# Patient Record
Sex: MALE | Race: WHITE | NOT HISPANIC OR LATINO | Employment: OTHER | URBAN - METROPOLITAN AREA
[De-identification: names, ages, dates, MRNs, and addresses within clinical notes are randomized per-mention and may not be internally consistent; named-entity substitution may affect disease eponyms.]

---

## 2021-05-25 ENCOUNTER — RECORDS - HEALTHEAST (OUTPATIENT)
Dept: ADMINISTRATIVE | Facility: CLINIC | Age: 86
End: 2021-05-25

## 2023-01-01 ENCOUNTER — HOSPITAL ENCOUNTER (EMERGENCY)
Facility: CLINIC | Age: 88
Discharge: LEFT BEFORE DISCHARGE | End: 2023-03-15
Attending: EMERGENCY MEDICINE | Admitting: EMERGENCY MEDICINE
Payer: MEDICARE

## 2023-01-01 ENCOUNTER — HOSPITAL ENCOUNTER (EMERGENCY)
Facility: CLINIC | Age: 88
Discharge: HOME | End: 2023-08-23
Attending: EMERGENCY MEDICINE | Admitting: EMERGENCY MEDICINE
Payer: MEDICARE

## 2023-01-01 ENCOUNTER — HOSPITAL ENCOUNTER (EMERGENCY)
Facility: CLINIC | Age: 88
Discharge: HOME | End: 2023-10-19
Attending: EMERGENCY MEDICINE | Admitting: EMERGENCY MEDICINE
Payer: MEDICARE

## 2023-01-01 ENCOUNTER — APPOINTMENT (OUTPATIENT)
Dept: RADIOLOGY | Facility: CLINIC | Age: 88
End: 2023-01-01
Attending: EMERGENCY MEDICINE
Payer: MEDICARE

## 2023-01-01 VITALS
BODY MASS INDEX: 29.47 KG/M2 | SYSTOLIC BLOOD PRESSURE: 202 MMHG | WEIGHT: 199 LBS | OXYGEN SATURATION: 96 % | HEIGHT: 69 IN | TEMPERATURE: 97.9 F | HEART RATE: 67 BPM | DIASTOLIC BLOOD PRESSURE: 92 MMHG | RESPIRATION RATE: 20 BRPM

## 2023-01-01 VITALS
DIASTOLIC BLOOD PRESSURE: 89 MMHG | RESPIRATION RATE: 28 BRPM | OXYGEN SATURATION: 94 % | SYSTOLIC BLOOD PRESSURE: 187 MMHG | HEART RATE: 87 BPM | WEIGHT: 220 LBS | TEMPERATURE: 98.2 F

## 2023-01-01 VITALS
TEMPERATURE: 98.9 F | SYSTOLIC BLOOD PRESSURE: 177 MMHG | DIASTOLIC BLOOD PRESSURE: 82 MMHG | OXYGEN SATURATION: 91 % | HEART RATE: 74 BPM | RESPIRATION RATE: 35 BRPM
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
## 2023-01-01 DIAGNOSIS — S63.259A FINGER DISLOCATION, INITIAL ENCOUNTER: ICD-10-CM

## 2023-01-01 DIAGNOSIS — K59.00 CONSTIPATION, UNSPECIFIED CONSTIPATION TYPE: ICD-10-CM

## 2023-01-01 DIAGNOSIS — R60.0 LOWER EXTREMITY EDEMA: ICD-10-CM

## 2023-01-01 DIAGNOSIS — S01.01XA SCALP LACERATION, INITIAL ENCOUNTER: ICD-10-CM

## 2023-01-01 LAB
ALBUMIN SERPL BCG-MCNC: 3.9 G/DL (ref 3.5–5.2)
ALP SERPL-CCNC: 64 U/L (ref 40–129)
ALT SERPL W P-5'-P-CCNC: 7 U/L (ref 0–70)
ANION GAP SERPL CALCULATED.3IONS-SCNC: 8 MMOL/L (ref 7–15)
AST SERPL W P-5'-P-CCNC: 19 U/L (ref 0–45)
ATRIAL RATE - MUSE: 76 BPM
BASO+EOS+MONOS # BLD AUTO: ABNORMAL 10*3/UL
BASO+EOS+MONOS NFR BLD AUTO: ABNORMAL %
BASOPHILS # BLD AUTO: 0 10E3/UL (ref 0–0.2)
BASOPHILS NFR BLD AUTO: 0 %
BILIRUB DIRECT SERPL-MCNC: <0.2 MG/DL (ref 0–0.3)
BILIRUB SERPL-MCNC: 0.4 MG/DL
BUN SERPL-MCNC: 12.3 MG/DL (ref 8–23)
CALCIUM SERPL-MCNC: 8.9 MG/DL (ref 8.8–10.2)
CHLORIDE SERPL-SCNC: 101 MMOL/L (ref 98–107)
CREAT SERPL-MCNC: 1.14 MG/DL (ref 0.67–1.17)
DEPRECATED HCO3 PLAS-SCNC: 30 MMOL/L (ref 22–29)
DIASTOLIC BLOOD PRESSURE - MUSE: NORMAL MMHG
EGFRCR SERPLBLD CKD-EPI 2021: 63 ML/MIN/1.73M2
EOSINOPHIL # BLD AUTO: 0.1 10E3/UL (ref 0–0.7)
EOSINOPHIL NFR BLD AUTO: 2 %
ERYTHROCYTE [DISTWIDTH] IN BLOOD BY AUTOMATED COUNT: 13.3 % (ref 10–15)
GLUCOSE SERPL-MCNC: 100 MG/DL (ref 70–99)
HCT VFR BLD AUTO: 36.6 % (ref 40–53)
HGB BLD-MCNC: 11.8 G/DL (ref 13.3–17.7)
IMM GRANULOCYTES # BLD: 0 10E3/UL
IMM GRANULOCYTES NFR BLD: 1 %
INTERPRETATION ECG - MUSE: NORMAL
LYMPHOCYTES # BLD AUTO: 2.2 10E3/UL (ref 0.8–5.3)
LYMPHOCYTES NFR BLD AUTO: 25 %
MAGNESIUM SERPL-MCNC: 2.1 MG/DL (ref 1.7–2.3)
MCH RBC QN AUTO: 30.4 PG (ref 26.5–33)
MCHC RBC AUTO-ENTMCNC: 32.2 G/DL (ref 31.5–36.5)
MCV RBC AUTO: 94 FL (ref 78–100)
MONOCYTES # BLD AUTO: 1.1 10E3/UL (ref 0–1.3)
MONOCYTES NFR BLD AUTO: 12 %
NEUTROPHILS # BLD AUTO: 5.1 10E3/UL (ref 1.6–8.3)
NEUTROPHILS NFR BLD AUTO: 60 %
NRBC # BLD AUTO: 0 10E3/UL
NRBC BLD AUTO-RTO: 0 /100
P AXIS - MUSE: 47 DEGREES
PLATELET # BLD AUTO: 326 10E3/UL (ref 150–450)
POTASSIUM SERPL-SCNC: 4.3 MMOL/L (ref 3.4–5.3)
PR INTERVAL - MUSE: 144 MS
PROT SERPL-MCNC: 6.3 G/DL (ref 6.4–8.3)
QRS DURATION - MUSE: 88 MS
QT - MUSE: 408 MS
QTC - MUSE: 459 MS
R AXIS - MUSE: 54 DEGREES
RBC # BLD AUTO: 3.88 10E6/UL (ref 4.4–5.9)
SODIUM SERPL-SCNC: 139 MMOL/L (ref 135–145)
SYSTOLIC BLOOD PRESSURE - MUSE: NORMAL MMHG
T AXIS - MUSE: 48 DEGREES
TROPONIN T SERPL HS-MCNC: 58 NG/L
TROPONIN T SERPL HS-MCNC: 64 NG/L
VENTRICULAR RATE- MUSE: 76 BPM
WBC # BLD AUTO: 8.5 10E3/UL (ref 4–11)

## 2023-01-01 PROCEDURE — 99285 EMERGENCY DEPT VISIT HI MDM: CPT | Mod: 25

## 2023-01-01 PROCEDURE — 84484 ASSAY OF TROPONIN QUANT: CPT | Performed by: EMERGENCY MEDICINE

## 2023-01-01 PROCEDURE — 12001 RPR S/N/AX/GEN/TRNK 2.5CM/<: CPT

## 2023-01-01 PROCEDURE — 83735 ASSAY OF MAGNESIUM: CPT | Performed by: EMERGENCY MEDICINE

## 2023-01-01 PROCEDURE — 96374 THER/PROPH/DIAG INJ IV PUSH: CPT | Mod: 59

## 2023-01-01 PROCEDURE — 250N000011 HC RX IP 250 OP 636: Performed by: EMERGENCY MEDICINE

## 2023-01-01 PROCEDURE — 93005 ELECTROCARDIOGRAM TRACING: CPT | Performed by: EMERGENCY MEDICINE

## 2023-01-01 PROCEDURE — 36415 COLL VENOUS BLD VENIPUNCTURE: CPT | Performed by: EMERGENCY MEDICINE

## 2023-01-01 PROCEDURE — 84484 ASSAY OF TROPONIN QUANT: CPT | Mod: 91 | Performed by: EMERGENCY MEDICINE

## 2023-01-01 PROCEDURE — 99283 EMERGENCY DEPT VISIT LOW MDM: CPT

## 2023-01-01 PROCEDURE — 71045 X-RAY EXAM CHEST 1 VIEW: CPT

## 2023-01-01 PROCEDURE — 26770 TREAT FINGER DISLOCATION: CPT | Mod: F9

## 2023-01-01 PROCEDURE — 80053 COMPREHEN METABOLIC PANEL: CPT | Performed by: EMERGENCY MEDICINE

## 2023-01-01 PROCEDURE — 85025 COMPLETE CBC W/AUTO DIFF WBC: CPT | Performed by: EMERGENCY MEDICINE

## 2023-01-01 PROCEDURE — 82248 BILIRUBIN DIRECT: CPT | Performed by: EMERGENCY MEDICINE

## 2023-01-01 PROCEDURE — 74019 RADEX ABDOMEN 2 VIEWS: CPT

## 2023-01-01 PROCEDURE — 999N000065 XR FINGER RIGHT G/E 2 VIEWS: Mod: RT

## 2023-01-01 RX ORDER — HYDROMORPHONE HYDROCHLORIDE 1 MG/ML
0.5 INJECTION, SOLUTION INTRAMUSCULAR; INTRAVENOUS; SUBCUTANEOUS ONCE
Status: COMPLETED | OUTPATIENT
Start: 2023-01-01 | End: 2023-01-01

## 2023-01-01 RX ORDER — FUROSEMIDE 20 MG
20 TABLET ORAL DAILY
Qty: 14 TABLET | Refills: 0 | Status: SHIPPED | OUTPATIENT
Start: 2023-01-01 | End: 2023-01-01

## 2023-01-01 RX ADMIN — HYDROMORPHONE HYDROCHLORIDE 0.5 MG: 1 INJECTION, SOLUTION INTRAMUSCULAR; INTRAVENOUS; SUBCUTANEOUS at 22:04

## 2023-01-01 ASSESSMENT — ACTIVITIES OF DAILY LIVING (ADL)
ADLS_ACUITY_SCORE: 35
ADLS_ACUITY_SCORE: 35

## 2023-03-15 PROBLEM — J44.9 COPD (CHRONIC OBSTRUCTIVE PULMONARY DISEASE) (H): Status: ACTIVE | Noted: 2021-09-13

## 2023-03-15 PROBLEM — G24.01 TARDIVE DYSKINESIA: Status: ACTIVE | Noted: 2023-01-01

## 2023-03-15 PROBLEM — F03.90 DEMENTIA (H): Status: ACTIVE | Noted: 2021-09-13

## 2023-03-15 PROBLEM — N18.31 STAGE 3A CHRONIC KIDNEY DISEASE (H): Status: ACTIVE | Noted: 2021-09-13

## 2023-03-15 NOTE — ED PROVIDER NOTES
Emergency Department Encounter     Evaluation Date & Time:   3/15/2023  3:06 PM    CHIEF COMPLAINT:  Constipation and Abdominal Pain      Triage Note:  Patient presents to the ED with c/o constipation and elevated blood pressures. Patient is currently on hospice but the RN wanted patient to be evaluated for a possible impaction and blood pressure control. Patient does take oxy and morphine for generalized pain. Patient brought in by grand daughter Alicia Bolaños. Her number is .             ED COURSE & MEDICAL DECISION MAKING:     ED Course as of 03/15/23 2025   Wed Mar 15, 2023   1546 Patient is requesting to leave the hospital against medical advice.  The patient is clinically sober, free from distracting injury, appears to have intact insight and judgment and reason, and in my opinion has the capacity to make decisions.  We had a lengthy discussion regarding their presenting signs and symptoms, including my concerns regarding them with the need for further evaluation and management.  They have verbalized understanding of these concerns.  I discussed in detail with the patient that if they leave, they could significantly worsen, become critically ill, and even possibly become disabled or die.  Despite offering alternative pathways to care, I am unable to convince the patient to stay.  I have asked them to return to the emergency department at any time if they change their mind, and we will be happy to resume their care.  I answered all additional questions and encouraged close follow up with a primary care provider if they choose not return to our ED.  The patient and family have no further questions at this time and understand they are always welcome back.       Pt's granddaughter informed by nursing of this and she will be in to take him home.  I did still send Lactulose to help constipation to his pharmacy.  Pt encouraged to follow up, return for new/worsening concerns.     Pt here from home where  "he's under hospice care for concerns about ongoing constipation and elevated blood pressures. Granddaughter confirms this is why he was sent in.  No vomiting, very benign abdomen.  Pt afebrile, nontoxic with no indication for emergent lowering of BP.  Granddaughter also reports she was told by nursing he is \"dehydrated\" and needs to be seen in the ED.  Will get labs, hydrate with IVF.  Abd xray from triage shows constipation.  Pt has no rectal pain or signs of fecal impaction.  Anticipate home with lactulose to help with constipation as pt on various narcotics for chronic pain.  Hospice concerned about his pain management, but this seems to stem from constipation discomfort at times.      3:10 PM I met the patient and performed my initial interview and exam.     Medical Decision Making    History:    Supplemental history from: Family Member/Significant Other    External Record(s) reviewed: Documented in chart, if applicable.    Work Up:    Chart documentation includes differential considered and any EKGs or imaging independently interpreted by provider, where specified.    In additional to work up documented, I considered the following work up: Documented in chart, if applicable.    External consultation:    Discussion of management with another provider: Documented in chart, if applicable    Complicating factors:    Care impacted by chronic illness: Chronic Kidney Disease, Chronic Lung Disease, Chronic Pain, Dementia, Diabetes, Hyperlipidemia, Hypertension and Smoking / Nicotine Use    Care affected by social determinants of health: N/A    Disposition considerations: Patient left AMA.      At the conclusion of the encounter I discussed the results of all the tests and the disposition. The questions were answered. The patient or family acknowledged understanding and was agreeable with the care plan.      MEDICATIONS GIVEN IN THE EMERGENCY DEPARTMENT:  Medications - No data to display    NEW PRESCRIPTIONS STARTED AT " "TODAY'S ED VISIT:  Discharge Medication List as of 3/15/2023  3:52 PM      START taking these medications    Details   lactulose (CEPHULAC) 20 GM packet Take 1 packet (20 g) by mouth daily for 5 days, Disp-5 packet, R-0, E-Prescribe             HPI   HPI     Austyn Nuñez is a 87 year old male with a pertinent history of dementia, COPD, DM type II, HTN, HLD, CKD 3, prior TIA, chronic pain, and tobacco use disorder who presents to this ED by walk-in for evaluation of constipation and abdominal pain.    The patient reports worsening constipation along with some left sided abdominal pain the past ~1 week. He states his last normal BM was 1 week ago. He has only had very small stools in the time since. He endorses regular use of pain medications. When asked about his left sided abdominal pain, the patient reports his \"kidney\" hurts. Denies any history of kidney transplant. The patient also notes some increased shortness of breath and dizziness the past few months. He says he feels lightheaded when he stands up and moves around. The patient otherwise denies any nausea, vomiting, rectal pain or bleeding, chest pain, or any other complaints at this time.     Per triage note, the patient was sent to the ED today after his hospice nurse noticed he has had elevated BP and worsening constipation. She reported concern about impaction. Triage note indicates patient does take oxycodone and morphine for generalized pain. Patient was brought in by his granddaughter, Alicia Bolaños. Her number is .    SHx: The patient reports he lives with his daughter and granddaughter.      REVIEW OF SYSTEMS:  Review of Systems    See HPI.      Medical History     Past Medical History:   Diagnosis Date     Anxiety state      Diabetes mellitus (H)        No past surgical history on file.    No family history on file.         lactulose (CEPHULAC) 20 GM packet  aspirin 325 MG tablet  docusate sodium (COLACE) 50 MG capsule  ferrous " sulfate 325 (65 FE) MG tablet  fish oil-omega-3 fatty acids (FISH OIL) 1000 MG capsule  gabapentin (NEURONTIN) 300 MG capsule  hydrocodone-acetaminophen 5-325 MG per tablet  MEDS UNK - RECORDS REQUESTED  metformin (GLUCOPHAGE) 1000 MG tablet  olanzapine (ZYPREXA) 5 MG tablet  omeprazole (PRILOSEC) 20 MG capsule  ondansetron (ZOFRAN) 4 MG tablet  ORDER FOR DME  ranitidine (ZANTAC) 150 MG tablet  simvastatin (ZOCOR) 40 MG tablet  sucralfate (CARAFATE) 1 GM tablet  TRAZodone (DESYREL) 50 MG tablet  venlafaxine (EFFEXOR-ER) 225 MG TB24        Physical Exam     Vitals:  BP (!) 187/89 (BP Location: Right arm, Patient Position: Semi-Flynn's, Cuff Size: Adult Regular)   Pulse 87   Temp 98.2  F (36.8  C) (Temporal)   Resp 28   Wt 99.8 kg (220 lb)   SpO2 94%     PHYSICAL EXAM:   Physical Exam  Vitals and nursing note reviewed.   Constitutional:       General: He is not in acute distress.     Appearance: Normal appearance.      Comments: Chronically ill-appearing   HENT:      Head: Normocephalic and atraumatic.      Nose: Nose normal.      Mouth/Throat:      Mouth: Mucous membranes are moist.   Eyes:      Pupils: Pupils are equal, round, and reactive to light.   Cardiovascular:      Rate and Rhythm: Normal rate and regular rhythm.      Pulses: Normal pulses.           Radial pulses are 2+ on the right side and 2+ on the left side.        Dorsalis pedis pulses are 2+ on the right side and 2+ on the left side.   Pulmonary:      Effort: Pulmonary effort is normal. No respiratory distress.      Breath sounds: Normal breath sounds.   Abdominal:      Palpations: Abdomen is soft.      Tenderness: There is no abdominal tenderness.   Musculoskeletal:      Cervical back: Full passive range of motion without pain and neck supple.      Comments: No calf tenderness or swelling b/l   Skin:     General: Skin is warm.      Findings: No rash.   Neurological:      General: No focal deficit present.      Mental Status: He is alert. Mental  status is at baseline.      Comments: Fluent speech, no acute lateralizing deficits   Psychiatric:         Mood and Affect: Mood normal.         Behavior: Behavior normal.         Results     LAB:  All pertinent labs reviewed and interpreted  Labs Ordered and Resulted from Time of ED Arrival to Time of ED Departure - No data to display    RADIOLOGY:  Abdomen XR, 2 vw, flat and upright   Final Result   IMPRESSION: Nonobstructive bowel gas pattern. Moderate amount of retained stool throughout the colon. No free air, pneumatosis, or portal venous gas to suggest perforation.                     FINAL IMPRESSION:    ICD-10-CM    1. Constipation, unspecified constipation type  K59.00           0 minutes of critical care time      I, Ayse Dumont, am serving as a scribe to document services personally performed by Dr. Biju Maldonado, based on my observations and the provider's statements to me. I, Biju Maldonado, DO attest that Ayse Dumont is acting in a scribe capacity, has observed my performance of the services and has documented them in accordance with my direction.      Biju Maldonado DO  Emergency Medicine  [facility] EMERGENCY ROOM  3/15/2023  3:17 PM        Biju Maldonado MD  03/15/23 2025

## 2023-03-15 NOTE — ED NOTES
"Pt left the room and went out into the waiting room. RN went and talked with the pt and he stated that he was \"looking for his granddaughter and would wait right here for her\". When asked if he wanted the RN to get blood work and to give a fluid bolus the pt declined and stated that he wanted to go home. MD was notified and the granddaughter was notified. Pt is waiting for his ride in the waiting room.     "

## 2023-03-15 NOTE — DISCHARGE INSTRUCTIONS
You are leaving against medical advice.  I put in for a lactulose prescription for you to take.  You can take this as directed, return for new concerns and follow up with primary clinic.

## 2023-03-15 NOTE — ED TRIAGE NOTES
Patient presents to the ED with c/o constipation and elevated blood pressures. Patient is currently on hospice but the RN wanted patient to be evaluated for a possible impaction and blood pressure control. Patient does take oxy and morphine for generalized pain. Patient brought in by grand daughter Alicia Bolaños. Her number is .     Triage Assessment     Row Name 03/15/23 2138       Triage Assessment (Adult)    Airway WDL WDL       Respiratory WDL    Respiratory WDL WDL       Skin Circulation/Temperature WDL    Skin Circulation/Temperature WDL WDL       Cardiac WDL    Cardiac WDL WDL       Peripheral/Neurovascular WDL    Peripheral Neurovascular WDL WDL       Cognitive/Neuro/Behavioral WDL    Cognitive/Neuro/Behavioral WDL WDL

## 2023-08-23 NOTE — DISCHARGE INSTRUCTIONS
Ice off-and-on if hurting.  Tylenol every 6 hours if hurting.  Staple removal in 10 days per hosp[facility] or clinic or [facility].  See doctor problems or signs of infection or concerns.

## 2023-08-23 NOTE — ED PROVIDER NOTES
EMERGENCY DEPARTMENT ENCOUNTER      NAME: Austyn Nuñez  AGE: 87 year old male  YOB: 1935  MRN: [id number]  EVALUATION DATE & TIME: 2023  1:22 AM    PCP: No Ref-Primary, Physician    ED PROVIDER: Biju Hodgson M.D.      Chief Complaint   Patient presents with    Laceration         FINAL IMPRESSION:  2 cm scalp laceration.      ED COURSE & MEDICAL DECISION MAKIN:50 AM.  I met with the patient to gather history and to perform my initial exam. We discussed plans for the ED course, including diagnostic testing and treatment. PPE worn: cloth mask.  Patient fell and tripped going to the bathroom at home and hit the posterior aspect of his head on the wheelchair.  He now has a 2 cm laceration.  [facility]  No loss conscious.  No blood thinners.  No neurological deficits.  Patient is on hospice for his COPD.  Laceration repair under taken as described below.  Patient be discharged home thereafter.  Patient and family in agreement.      Pertinent Labs & Imaging studies reviewed. (See chart for details)  87 year old male presents to the Emergency Department for evaluation of scalp laceration.    At the conclusion of the encounter I discussed the results of all of the tests and the disposition. The questions were answered. The patient or family acknowledged understanding and was agreeable with the care plan.              Medical Decision Making    History:  Supplemental history from: History from daughter.  External Record(s) reviewed: Both inpatient and outpatient computer records reviewed.    Work Up:  Chart documentation includes differential considered and any EKGs or imaging independently interpreted by provider, where specified.  Differential diagnosis includes scalp laceration.  In additional to work up documented, I considered the following work up: Documented in chart, if applicable.    External consultation:  Discussion of management with another provider:  Documented in chart, if applicable    Complicating factors:  Care impacted by chronic illness: History of dementia, diabetes, hypertension, schizoaffective disorder.  Care affected by social determinants of health: Access to Medical Care    Disposition considerations: Patient will be discharged home after laceration repair.          MEDICATIONS GIVEN IN THE EMERGENCY:    NEW PRESCRIPTIONS STARTED AT TODAY'S ER VISIT  New Prescriptions    No medications on file          =================================================================    HPI    Patient information was obtained from: Patient and daughter.    Use of : N/A         Austyn Nuñez is a 87 year old male with a pertinent history of COPD and dementia who presents to this ED today for evaluation of posterior scalp laceration.  2 cm laceration after falling and hitting his head on his wheelchair.  Tetanus status up-to-date as of 2021 at the [facility] for tetanus.  No other complaints.  Patient is on hospice.  No neurological deficits or loss conscious.  No blood thinners.    He does not identify any waxing or waning symptoms otherwise, exacerbating or alleviating features, associated symptoms except as mentioned.  He denies any pain related complaints.    REVIEW OF SYSTEMS   Review of Systems complaining of a scalp laceration only.  Mild headache in the same location.  No neurological deficits, no other complaints.    PAST MEDICAL HISTORY:  Past Medical History:   Diagnosis Date    Anxiety state     No Comments Provided    Diabetes mellitus (H)     No Comments Provided       PAST SURGICAL HISTORY:  No past surgical history on file.        CURRENT MEDICATIONS:    aspirin 325 MG tablet  docusate sodium (COLACE) 50 MG capsule  ferrous sulfate 325 (65 FE) MG tablet  fish oil-omega-3 fatty acids (FISH OIL) 1000 MG capsule  gabapentin (NEURONTIN) 300 MG capsule  hydrocodone-acetaminophen 5-325 MG per tablet  MEDS UNK - RECORDS REQUESTED  metformin  "(GLUCOPHAGE) 1000 MG tablet  olanzapine (ZYPREXA) 5 MG tablet  omeprazole (PRILOSEC) 20 MG capsule  ondansetron (ZOFRAN) 4 MG tablet  ORDER FOR DME  ranitidine (ZANTAC) 150 MG tablet  simvastatin (ZOCOR) 40 MG tablet  sucralfate (CARAFATE) 1 GM tablet  TRAZodone (DESYREL) 50 MG tablet  venlafaxine (EFFEXOR-ER) 225 MG TB24        ALLERGIES:  Allergies   Allergen Reactions    Oxycodone Rash    Thorazine [Chlorpromazine Hcl] Other (See Comments)     Tingling  & anxious        FAMILY HISTORY:  No family history on file.    SOCIAL HISTORY:   Social History     Socioeconomic History    Marital status: Single   Former tobacco use.  Marijuana use and opiate use.  No alcohol.    VITALS:  BP (!) 202/92   Pulse 67   Temp 97.9  F (36.6  C) (Temporal)   Resp 20   Ht 1.753 m (5' 9\")   Wt 90.3 kg (199 lb)   SpO2 96%   BMI 29.39 kg/m      PHYSICAL EXAM    Vital Signs:  BP (!) 202/92   Pulse 67   Temp 97.9  F (36.6  C) (Temporal)   Resp 20   Ht 1.753 m (5' 9\")   Wt 90.3 kg (199 lb)   SpO2 96%   BMI 29.39 kg/m    General:  On entering the room he is in no apparent distress.    Neck:  Neck supple with full range of motion and nontender.    Back:  Back and spine are nontender.  No costovertebral angle tenderness.    HEENT:  Oropharynx clear with moist mucous membranes.  HEENT unremarkable.    Pulmonary:  Chest clear to auscultation without rhonchi rales or wheezing.    Cardiovascular:  Cardiac regular rate and rhythm without murmurs rubs or gallops.    Abdomen:  Abdomen soft nontender.  There is no rebound or guarding.    Muskuloskeletal:  He moves all 4 without any difficulty and has normal neurovascular exams.  Extremities without clubbing, cyanosis, or edema.  Legs and calves are nontender.    Neuro:  He is alert and oriented ×3 and moves all extremities symmetrically.  Alert and oriented.  Moves all extremities symmetrically.  Sensation intact in all extremities.  Psych:  Normal affect.    Skin:  Unremarkable and warm " and dry.  2 cm posterior scalp laceration.  Laceration repair as discussed below.       LAB:  All pertinent labs reviewed and interpreted.  Labs Ordered and Resulted from Time of ED Arrival to Time of ED Departure - No data to display    RADIOLOGY:  Reviewed all pertinent imaging. Please see official radiology report.  No orders to display              EKG:            PROCEDURES:   Was anesthetized with 1% plain lidocaine.  Thoroughly irrigated and explored and closed with 3 staples.  Patient tolerated the procedure well.      Biju Hodgson M.D.  Emergency Medicine  [facility] EMERGENCY ROOM  [address] [place] [id number] [phone]  Dept: [phone]       Biju Hodgson MD  08/23/23 0151

## 2023-10-19 NOTE — ED TRIAGE NOTES
Pt presents to the ED with c/o fall, finger injury, and Cp that occurred PTA. Pt fell onto L hand, left 5th digit dislocated. Pt states that the left sided CP started after the fall. Pt did not hit head, no LOC, not on blood thinners. Granddaughter endorses mechanical fall. Granddaughter states that pt is on hospice.      Triage Assessment (Adult)       Row Name 10/18/23 2866          Triage Assessment    Airway WDL WDL        Respiratory WDL    Respiratory WDL WDL        Skin Circulation/Temperature WDL    Skin Circulation/Temperature WDL WDL        Cardiac WDL    Cardiac WDL X;chest pain        Chest Pain Assessment    Chest Pain Location anterior chest, left     Precipitating Factors other (see comments)  fall        Peripheral/Neurovascular WDL    Peripheral Neurovascular WDL WDL        Cognitive/Neuro/Behavioral WDL    Cognitive/Neuro/Behavioral WDL WDL

## 2023-10-19 NOTE — ED PROVIDER NOTES
Emergency Department Encounter     Evaluation Date & Time:   10/18/2023  9:34 PM    CHIEF COMPLAINT:  Fall and Chest Pain      Triage Note:              ED COURSE & MEDICAL DECISION MAKING:     ED Course as of 10/19/23 0133   Wed Oct 18, 2023   2204 Finger reduced without difficulty and pt tolerated well. Xrays ordered for afterwards.  Will place in finger splint.   2234 hsTrop 64, will need 2 hour delta.  Order placed.   2253 Rest of labs overall unremarkable and reassuring.   2256 CXR (independent interpretation): mild edema, no focal opacities    Right finger xrays (independent interp): no acute displaced fracture or dislocation   2340 Discussed with pt and granddaughter results.  Pt has chronic b/l LE edema and redness.  He has home O2, but doesn't typically wear. Pt and daughter state redness is baseline. Pt refuses to stay in hospital after our discussion regarding hospitalization for some diuresis and monitoring. Will discharge with lasix low dose and refer to cardiology. Encouraged him to wear O2 at home.     Thu Oct 19, 2023   0021 Delta trop 58, ACS ruled out per protocol.  Pt refuses to stay in hospital for further care.     Pt here with ground level, mechanical fall just pta at home.  Pt tripped on ground, landed on buttocks and right hand.  Presents with right pinky finger dislocation at the PIP.  Will need reduction.  I did digital nerve block per his request and will manipulate into place.  Pt and family deny any sort of LOC or head injury. Pt not anticoagulated and no new neck or back pain.  No indication for CT brain or spinal imaging.  Pt developed left sided chest pain after fall.  Was not having pain before and pain is not reproducible.  No new dyspnea or new abdominal pain. Will get labs, CXR and reassess.  No indication for CTA chest with very reassuring vitals.    9:41 PM I met with the patient, obtained history, performed an initial exam, and discussed options and plan for diagnostics and  treatment here in the ED.   9:58 PM I reduced patient's finger.   11:23 PM I rechecked and updated patient on results and care plan.   12:26 AM I rechecked and updated patient on results. We discussed the plan for discharge and the patient is agreeable. Reviewed supportive cares, symptomatic treatment, outpatient follow up, and reasons to return to the Emergency Department. Patient to be discharged by ED RN.      Medical Decision Making    History:  Supplemental history from: Documented in chart, if applicable and Caregiver  External Record(s) reviewed: Documented in chart, if applicable.    Work Up:  Chart documentation includes differential considered and any EKGs or imaging independently interpreted by provider, where specified.  In additional to work up documented, I considered the following work up: Documented in chart, if applicable.    External consultation:  Discussion of management with another provider: Documented in chart, if applicable    Complicating factors:  Care impacted by chronic illness: Chronic Kidney Disease, Chronic Lung Disease, Dementia, Diabetes, Hyperlipidemia, Hypertension, and Mental Health  Care affected by social determinants of health: N/A    Disposition considerations: Discharge. I prescribed additional prescription strength medication(s) as charted. See documentation for any additional details.      At the conclusion of the encounter I discussed the results of all the tests and the disposition. The questions were answered. The patient or family acknowledged understanding and was agreeable with the care plan.      MEDICATIONS GIVEN IN THE EMERGENCY DEPARTMENT:  Medications   HYDROmorphone (PF) (DILAUDID) injection 0.5 mg (0.5 mg Intravenous $Given 10/18/23 2204)       NEW PRESCRIPTIONS STARTED AT TODAY'S ED VISIT:  Discharge Medication List as of 10/19/2023  1:09 AM        START taking these medications    Details   furosemide (LASIX) 20 MG tablet Take 1 tablet (20 mg) by mouth daily  for 14 days, Disp-14 tablet, R-0, E-Prescribe             HPI   HPI     Austyn Nuñez is a 87 year old male with a pertinent history of HTN, hyperlipidemia, COPD, DM2, CKD, TIA, SCOTTY, schizoaffective disorder, dementia, opioid dependence, cannabis abuse, who presents to this ED via walk-in for evaluation of fall and chest pain.    Patient reports he tripped on the rug tonight about 25 minutes ago and fell down. No head injuries. Granddaughter states that she heard him fall down and found him on the ground on his buttocks. She believes that patient might have fallen onto his right pinky finger. Patient now reports deformity to right pinky. After the fall, he states that he developed some left chest pain. Patient also feels short of breath but granddaughter reports a history of COPD. He is unsure if shortness of breath is worse than normal. He also notes some abdominal pain but granddaughter states this is chronic. Otherwise, he denies any neck pain and back pain. He is not on blood thinners. Patient is right handed. No other complaints at this time.     REVIEW OF SYSTEMS:  See HPI      Medical History     Past Medical History:   Diagnosis Date    Anxiety state     Diabetes mellitus (H)        History reviewed. No pertinent surgical history.    History reviewed. No pertinent family history.         furosemide (LASIX) 20 MG tablet  aspirin 325 MG tablet  docusate sodium (COLACE) 50 MG capsule  ferrous sulfate 325 (65 FE) MG tablet  fish oil-omega-3 fatty acids (FISH OIL) 1000 MG capsule  gabapentin (NEURONTIN) 300 MG capsule  hydrocodone-acetaminophen 5-325 MG per tablet  MEDS UNK - RECORDS REQUESTED  metformin (GLUCOPHAGE) 1000 MG tablet  olanzapine (ZYPREXA) 5 MG tablet  omeprazole (PRILOSEC) 20 MG capsule  ondansetron (ZOFRAN) 4 MG tablet  ORDER FOR DME  ranitidine (ZANTAC) 150 MG tablet  simvastatin (ZOCOR) 40 MG tablet  sucralfate (CARAFATE) 1 GM tablet  TRAZodone (DESYREL) 50 MG tablet  venlafaxine  (EFFEXOR-ER) 225 MG TB24        Physical Exam     Vitals:  BP (!) 177/82   Pulse 74   Temp 98.9  F (37.2  C) (Oral)   Resp (!) 35   SpO2 91%     PHYSICAL EXAM:   Physical Exam  Vitals and nursing note reviewed.   Constitutional:       General: He is not in acute distress.     Appearance: Normal appearance.      Comments: Anxious   HENT:      Head: Normocephalic and atraumatic.      Nose: Nose normal.      Mouth/Throat:      Mouth: Mucous membranes are moist.   Eyes:      Pupils: Pupils are equal, round, and reactive to light.   Neck:      Vascular: No JVD.   Cardiovascular:      Rate and Rhythm: Normal rate and regular rhythm.      Pulses: Normal pulses.           Radial pulses are 2+ on the right side and 2+ on the left side.        Dorsalis pedis pulses are 2+ on the right side and 2+ on the left side.   Pulmonary:      Effort: Pulmonary effort is normal. No respiratory distress.      Breath sounds: Normal breath sounds.   Chest:      Chest wall: No tenderness.   Abdominal:      Palpations: Abdomen is soft.      Tenderness: There is no abdominal tenderness.   Musculoskeletal:      Cervical back: Full passive range of motion without pain and neck supple.      Right lower le+ Edema (chronic) present.      Left lower le+ Edema (chronic) present.      Comments: Right pinky finger is laterally dislocated at PIP. No open wound or bleeding. No calf tenderness or swelling b/l.    Skin:     General: Skin is warm.      Findings: No rash.   Neurological:      General: No focal deficit present.      Mental Status: He is alert. Mental status is at baseline.      Comments: Fluent speech, no acute lateralizing deficits   Psychiatric:         Behavior: Behavior normal.         Results     LAB:  All pertinent labs reviewed and interpreted  Labs Ordered and Resulted from Time of ED Arrival to Time of ED Departure   BASIC METABOLIC PANEL - Abnormal       Result Value    Sodium 139      Potassium 4.3      Chloride 101       Carbon Dioxide (CO2) 30 (*)     Anion Gap 8      Urea Nitrogen 12.3      Creatinine 1.14      GFR Estimate 63      Calcium 8.9      Glucose 100 (*)    TROPONIN T, HIGH SENSITIVITY - Abnormal    Troponin T, High Sensitivity 64 (*)    HEPATIC FUNCTION PANEL - Abnormal    Protein Total 6.3 (*)     Albumin 3.9      Bilirubin Total 0.4      Alkaline Phosphatase 64      AST 19      ALT 7      Bilirubin Direct <0.20     CBC WITH PLATELETS AND DIFFERENTIAL - Abnormal    WBC Count 8.5      RBC Count 3.88 (*)     Hemoglobin 11.8 (*)     Hematocrit 36.6 (*)     MCV 94      MCH 30.4      MCHC 32.2      RDW 13.3      Platelet Count 326      % Neutrophils 60      % Lymphocytes 25      % Monocytes 12      Mids % (Monos, Eos, Basos)        % Eosinophils 2      % Basophils 0      % Immature Granulocytes 1      NRBCs per 100 WBC 0      Absolute Neutrophils 5.1      Absolute Lymphocytes 2.2      Absolute Monocytes 1.1      Mids Abs (Monos, Eos, Basos)        Absolute Eosinophils 0.1      Absolute Basophils 0.0      Absolute Immature Granulocytes 0.0      Absolute NRBCs 0.0     TROPONIN T, HIGH SENSITIVITY - Abnormal    Troponin T, High Sensitivity 58 (*)    MAGNESIUM - Normal    Magnesium 2.1         RADIOLOGY:  XR Finger Right G/E 2 Views   Final Result   IMPRESSION: Normal alignment. No fracture.      XR Chest Port 1 View   Final Result   IMPRESSION:    1. Mildly increased interstitial opacities in both lungs, most likely related to interstitial pulmonary edema.   2. No other convincing evidence of active cardiopulmonary disease.                    ECG:  NSR, rate 76, frequent PVCs, no acute ischemia    I have independently reviewed and interpreted the EKG(s) documented above     PROCEDURES:  Procedures:  PROCEDURE:  1. Finger Orthopedic Reduction    2. Finger Splint Application   INDICATIONS:  Dislocated PIP finger   PROCEDURE PROVIDER: Dr Ehsan Maldonado   MEDICATIONS: 1% lidocaine without epinephrine for digital nerve block    PROCEDURE NOTE: ORTHOPEDIC MANAGEMENT:  Bone/Joint Manipulation: Affected extremity was grasped and gradual traction was applied in-line with fracture. The site was further manipulated manually until alignment was improved.     Alignment improved post procedure.     SPLINTING/IMMOBILIZATION:   A  aluminum finger  splint made of  aluminum  was applied.  After placement I checked and adjusted the fit to ensure proper positioning. Patient was more comfortable with the splint in place.  Sensation and circulation are intact after splint placement.   COMPLICATIONS: Patient tolerated procedure well, without complication           FINAL IMPRESSION:    ICD-10-CM    1. Ground-level fall  W18.30XA       2. Finger dislocation, initial encounter  S63.259A       3. Lower extremity edema  R60.0 [facility]  Referral    chronic          0 minutes of critical care time      I, Aline Banuelos, am serving as a scribe to document services personally performed by Dr. Biju Maldonado, based on my observations and the provider's statements to me. I, Biju Maldonado, DO attest that Aline Banuelos is acting in a scribe capacity, has observed my performance of the services and has documented them in accordance with my direction.      Biju Maldonado DO  Emergency Medicine  [facility] EMERGENCY ROOM  10/18/2023  9:37 PM          Biju Maldonado MD  10/19/23 0133

## 2023-10-19 NOTE — DISCHARGE INSTRUCTIONS
Start taking lasix daily and follow up with primary clinic and cardiology.  I put in for cardiology referral - they should be calling you for appointment to be seen in the next week or so.  Elevate legs when able.  Please use your home oxygen at home.  Keep finger in splint for comfort and follow up with primary clinic regarding this.

## 2024-01-01 ENCOUNTER — HOSPITAL ENCOUNTER (EMERGENCY)
Facility: CLINIC | Age: 89
Discharge: HOME | End: 2024-02-14
Attending: EMERGENCY MEDICINE | Admitting: EMERGENCY MEDICINE
Payer: OTHER MISCELLANEOUS

## 2024-01-01 ENCOUNTER — APPOINTMENT (OUTPATIENT)
Dept: CT IMAGING | Facility: CLINIC | Age: 89
End: 2024-01-01
Attending: EMERGENCY MEDICINE
Payer: OTHER MISCELLANEOUS

## 2024-01-01 VITALS
RESPIRATION RATE: 20 BRPM | TEMPERATURE: 97.9 F | WEIGHT: 208.6 LBS | BODY MASS INDEX: 29.86 KG/M2 | OXYGEN SATURATION: 97 % | HEART RATE: 71 BPM | SYSTOLIC BLOOD PRESSURE: 135 MMHG | HEIGHT: 70 IN | DIASTOLIC BLOOD PRESSURE: 76 MMHG

## 2024-01-01 DIAGNOSIS — S01.01XA SCALP LACERATION, INITIAL ENCOUNTER: ICD-10-CM

## 2024-01-01 DIAGNOSIS — E86.0 DEHYDRATION: ICD-10-CM

## 2024-01-01 DIAGNOSIS — E87.6 HYPOKALEMIA: ICD-10-CM

## 2024-01-01 DIAGNOSIS — N28.9 RENAL INSUFFICIENCY: ICD-10-CM
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
## 2024-01-01 DIAGNOSIS — S09.90XA CLOSED HEAD INJURY, INITIAL ENCOUNTER: ICD-10-CM

## 2024-01-01 DIAGNOSIS — J44.9 END STAGE COPD (H): ICD-10-CM

## 2024-01-01 LAB
ANION GAP SERPL CALCULATED.3IONS-SCNC: 11 MMOL/L (ref 7–15)
BASOPHILS # BLD AUTO: 0 10E3/UL (ref 0–0.2)
BASOPHILS NFR BLD AUTO: 0 %
BUN SERPL-MCNC: 43.7 MG/DL (ref 8–23)
CALCIUM SERPL-MCNC: 9.8 MG/DL (ref 8.8–10.2)
CHLORIDE SERPL-SCNC: 91 MMOL/L (ref 98–107)
CREAT SERPL-MCNC: 1.62 MG/DL (ref 0.67–1.17)
DEPRECATED HCO3 PLAS-SCNC: 39 MMOL/L (ref 22–29)
EGFRCR SERPLBLD CKD-EPI 2021: 41 ML/MIN/1.73M2
EOSINOPHIL # BLD AUTO: 0.2 10E3/UL (ref 0–0.7)
EOSINOPHIL NFR BLD AUTO: 2 %
ERYTHROCYTE [DISTWIDTH] IN BLOOD BY AUTOMATED COUNT: 13.9 % (ref 10–15)
GLUCOSE SERPL-MCNC: 179 MG/DL (ref 70–99)
HCT VFR BLD AUTO: 44.7 % (ref 40–53)
HGB BLD-MCNC: 14.6 G/DL (ref 13.3–17.7)
HOLD SPECIMEN: NORMAL
HOLD SPECIMEN: NORMAL
IMM GRANULOCYTES # BLD: 0 10E3/UL
IMM GRANULOCYTES NFR BLD: 0 %
LYMPHOCYTES # BLD AUTO: 2.1 10E3/UL (ref 0.8–5.3)
LYMPHOCYTES NFR BLD AUTO: 24 %
MCH RBC QN AUTO: 30.4 PG (ref 26.5–33)
MCHC RBC AUTO-ENTMCNC: 32.7 G/DL (ref 31.5–36.5)
MCV RBC AUTO: 93 FL (ref 78–100)
MONOCYTES # BLD AUTO: 0.8 10E3/UL (ref 0–1.3)
MONOCYTES NFR BLD AUTO: 9 %
NEUTROPHILS # BLD AUTO: 5.7 10E3/UL (ref 1.6–8.3)
NEUTROPHILS NFR BLD AUTO: 65 %
NRBC # BLD AUTO: 0 10E3/UL
NRBC BLD AUTO-RTO: 0 /100
PLATELET # BLD AUTO: 255 10E3/UL (ref 150–450)
POTASSIUM SERPL-SCNC: 2.8 MMOL/L (ref 3.4–5.3)
RBC # BLD AUTO: 4.81 10E6/UL (ref 4.4–5.9)
SODIUM SERPL-SCNC: 141 MMOL/L (ref 135–145)
WBC # BLD AUTO: 8.8 10E3/UL (ref 4–11)

## 2024-01-01 PROCEDURE — 99285 EMERGENCY DEPT VISIT HI MDM: CPT | Mod: 25

## 2024-01-01 PROCEDURE — 85025 COMPLETE CBC W/AUTO DIFF WBC: CPT | Performed by: EMERGENCY MEDICINE

## 2024-01-01 PROCEDURE — 82374 ASSAY BLOOD CARBON DIOXIDE: CPT | Performed by: EMERGENCY MEDICINE

## 2024-01-01 PROCEDURE — 36415 COLL VENOUS BLD VENIPUNCTURE: CPT | Performed by: EMERGENCY MEDICINE

## 2024-01-01 PROCEDURE — 12002 RPR S/N/AX/GEN/TRNK2.6-7.5CM: CPT

## 2024-01-01 PROCEDURE — 70450 CT HEAD/BRAIN W/O DYE: CPT | Mod: ME

## 2024-01-01 PROCEDURE — G1010 CDSM STANSON: HCPCS

## 2024-01-01 PROCEDURE — 82565 ASSAY OF CREATININE: CPT | Performed by: EMERGENCY MEDICINE

## 2024-01-01 RX ORDER — POTASSIUM CHLORIDE 1500 MG/1
20 TABLET, EXTENDED RELEASE ORAL 2 TIMES DAILY
Qty: 60 TABLET | Refills: 0 | Status: SHIPPED | OUTPATIENT
Start: 2024-01-01

## 2024-01-01 ASSESSMENT — ACTIVITIES OF DAILY LIVING (ADL)
ADLS_ACUITY_SCORE: 35
ADLS_ACUITY_SCORE: 35

## 2024-02-14 NOTE — ED TRIAGE NOTES
Pt reports to the ED via EMS. PT lives at home with his family. Family states he had an unwitnessed fall. After he fell he walked into the living room and told his family he fell. PT has a large head lac to the right anterior side of his head that is was not controlled when EMS brought him in. Controlled with staff controlled pressure dressing in triage.   Triage Assessment (Adult)       Row Name 02/14/24 1256          Triage Assessment    Airway WDL WDL        Respiratory WDL    Respiratory WDL WDL        Peripheral/Neurovascular WDL    Peripheral Neurovascular WDL WDL

## 2024-02-14 NOTE — ED NOTES
Bed: [facility]19  Expected date: 2/14/24  Expected time: 12:38 PM  Means of arrival: Ambulance  Comments:

## 2024-02-14 NOTE — DISCHARGE INSTRUCTIONS
See clinic to have his staples removed in 7 to 10 days.  In the meantime he can do daily application of a topical antibiotic ointment like Neosporin or bacitracin.  Apply to the wound twice a day.  Keep it clean.  It is okay to shower but it should not be submerged in dirty water so no swimming or submersion in bath tub.    Talk with him and his primary care provider about assigning a power of .  He does seem to have some memory issues and it would be better to have an assigned decision-maker.    Talk with his hospice about adding potassium.  This can help with strength especially in someone who is chronically on a diuretic like Lasix.

## 2024-02-14 NOTE — ED PROVIDER NOTES
Emergency Department Encounter     Evaluation Date & Time:   2024 12:42 PM    CHIEF COMPLAINT:  Fall and Head Laceration      Triage Note:Pt reports to the ED via EMS. PT lives at home with his family. Family states he had an unwitnessed fall. After he fell he walked into the living room and told his family he fell. PT has a large head lac to the right anterior side of his head that is was not controlled when EMS brought him in. Controlled with staff controlled pressure dressing in triage.   Triage Assessment (Adult)       Row Name 24 1256          Triage Assessment    Airway WDL WDL        Respiratory WDL    Respiratory WDL WDL        Peripheral/Neurovascular WDL    Peripheral Neurovascular WDL WDL                         FINAL IMPRESSION:    ICD-10-CM    1. Closed head injury, initial encounter  S09.90XA       2. End stage COPD (H)  J44.9       3. Scalp laceration, initial encounter  S01.01XA       4. Fall at home, initial encounter  W19.XXXA     Y92.009       5. Renal insufficiency  N28.9       6. Dehydration  E86.0       7. Hypokalemia  E87.6           Impression and Plan     ED COURSE & MEDICAL DECISION MAKIN:10 PM I met with the patient to gather history and to perform my initial exam. We discussed plans for the ED course, including diagnostic testing and treatment.   1:14 PM Anaesthetic given   1:19 PM Laceration procedure used to close wound.   ED Course as of 24 1525      1344 Called to the patient's bedside earlier for repair of his laceration since it was continuing to spurt blood whenever they let a pressure.  It had been irrigated by nursing staff.  So, I did do a laceration repair after injecting with lidocaine with epinephrine.  That injection as well as the nurses initial pressure did stop the bleeding and then I did decide to do 3 subcuticular stitches to bring the wound together for better bleeding control.  Wound was then closed with staples.  The fall was  unwitnessed.  Patient himself is unclear as to what made him fall but it was in the bedroom so may simply have been orthostatic.  Will do a general set of blood work as although he is on hospice the family would be curious to find out if he were significantly dehydrated it could guide their therapy at home to work on hydration.  Also, they would like to do a CT scan of his head to know if there is bleeding there or what to expect in order to make more educated decisions regarding his care going forwards especially as he has been doing well while on hospice.   1347 He does tend to be mild to moderately hypoxic but is not symptomatic for it.  Family notes that he has end-stage COPD and has been told that he should wear oxygen previously but he has chosen not to.   1348 Patient's chart was reviewed.  He has 2 previous ER visits that I have reviewed on October 2023 and August 2023 both for fall and 1 with a scalp laceration.   1437 I talked with the patient's niece who is his primary caregiver.  She does note that he is currently his own decision maker though he seems to have some significant memory issues and cognitive decline so I have advised that they address who should be his power of  for medical decision making.  She will do this with their hospice team.  It was her mother who has since passed away.  She is now his primary caregiver but he does also have a daughter who is involved and lives in town.  For now, both his daughter and the niece agree that they do want to do the initial blood work here and CAT scan of his head to make decisions about what the next step should be since he was placed on hospice for COPD and has so far done okay   1438 Imaging reviewed, and the final radiology read.  Agree with radiology read.  In terms of the possible lower C-spine fracture clinically this is not present as he has no tenderness in the area and is mention he is clinically cleared without pain or radicular  symptoms with range of motion so this appears to be old.  He has not provided a urine sample yet which is less likely in an adult male without fever or symptoms, no anemia.  Awaiting BMP and if that is unremarkable should be able to discharge home.   1521 So he is a bit dehydrated and does have a low potassium.  They do feel comfortable with discharge home and will continue to encourage him to drink more fluids.  Will put him on some potassium replacement this should help with strength.       At the conclusion of the encounter I discussed the results of all the tests and the disposition. The questions were answered. The patient or family acknowledged understanding and was agreeable with the care plan.          0 minutes of critical care time        MEDICATIONS GIVEN IN THE EMERGENCY DEPARTMENT:  Medications - No data to display    NEW PRESCRIPTIONS STARTED AT TODAY'S ED VISIT:  New Prescriptions    POTASSIUM CHLORIDE ER (KLOR-CON M) 20 MEQ CR TABLET    Take 1 tablet (20 mEq) by mouth 2 times daily       HPI     HPI     Austyn Nuñez is a 88 year old male with a pertinent history of cannabis abuse, obesity, opioid dependence, COPD, hyperlipidemia, hypertension, DM2, mental health, CKD3a, TIA,  who presents to this ED via EMS for evaluation of laceration.     Patient's family reports he had an unwitnessed fall. Afterward he was able to walk to the living room and alter family that he had fallen. Sustained a large laceration to his right forehead. Denies any other injury. No use of blood thinners. Patient lives with his niece and is currently on hospice.     Per patient, he does not recall why he feel although he feels like it was more mechanical.     REVIEW OF SYSTEMS:  Review of Systems  remainder of systems are all otherwise negative.      Medical History     Past Medical History:   Diagnosis Date    Anxiety state     Diabetes mellitus (H)        No past surgical history on file.    No family history on  "file.         potassium chloride ER (KLOR-CON M) 20 MEQ CR tablet  aspirin 325 MG tablet  docusate sodium (COLACE) 50 MG capsule  ferrous sulfate 325 (65 FE) MG tablet  fish oil-omega-3 fatty acids (FISH OIL) 1000 MG capsule  furosemide (LASIX) 20 MG tablet  gabapentin (NEURONTIN) 300 MG capsule  hydrocodone-acetaminophen 5-325 MG per tablet  MEDS UNK - RECORDS REQUESTED  metformin (GLUCOPHAGE) 1000 MG tablet  olanzapine (ZYPREXA) 5 MG tablet  omeprazole (PRILOSEC) 20 MG capsule  ondansetron (ZOFRAN) 4 MG tablet  ORDER FOR DME  ranitidine (ZANTAC) 150 MG tablet  simvastatin (ZOCOR) 40 MG tablet  sucralfate (CARAFATE) 1 GM tablet  TRAZodone (DESYREL) 50 MG tablet  venlafaxine (EFFEXOR-ER) 225 MG TB24        Physical Exam     First Vitals:  Patient Vitals for the past 24 hrs:   BP Temp Temp src Pulse Resp SpO2 Height Weight   02/14/24 1445 -- 97.9  F (36.6  C) Oral -- -- -- -- --   02/14/24 1253 (!) 147/79 -- -- 83 19 (!) 86 % 1.778 m (5' 10\") 94.6 kg (208 lb 9.6 oz)       Constitutional:  Sitting up in bed, actively taking, no acute distress.   Eyes:  PERRLA bilaterally, no hyphema, no diplopia  HENT:  NCAT, no hemotympanum, no epistaxis, no septal hematoma, oropharynx clear, no blood in posterior pharynx, teeth intact, trachea midline. Bilateral TM swelling with opaque fluid posteriorly.   Spine:  Midline spine is nontender to palpation from occiput through sacrum. C spine clinically clear. No radicular pain with turning of neck.   Respiratory:  Clear to auscultation, equal breath sounds bilaterally, no subcutaneous air, atraumatic chest wall, stable chest wall to ap and lateral palpation.    Cardiovascular:   RRR S1 S2, no murmurs or friction rubs, No JVD, pulses are equal and symmetrically in all extremities. Bilateral 1-2+ swelling in lower extremities with chronic venus status changes.   Abdomen:  Soft, Non-distended, non-tender, atraumatic,  Pelvis:  Stable, nontender to ap and lateral compression and to rock. "   Musculoskeletal:  All extremities palpated and non tender. No deformities.    Integument:  Clean, 4 cm laceration to right anterior forehead.   Neurologic:  Awake, Alert, and Oriented x3, GCS , diffusely normal sensation including perirectally, spontaneously able to move all extremities      Results     LAB AND RADIOLOGY:  All pertinent labs reviewed and interpreted  Results for orders placed or performed during the hospital encounter of 02/14/24   CT Head w/o Contrast     Status: None    Narrative    EXAM: CT HEAD W/O CONTRAST  LOCATION: [facility]  DATE: 2/14/2024    INDICATION: trauma  COMPARISON: Head CT 02/18/2009  TECHNIQUE: Routine CT Head without IV contrast. Multiplanar reformats. Dose reduction techniques were used.    FINDINGS:  INTRACRANIAL CONTENTS: No intracranial hemorrhage. Mild diffuse cerebral parenchymal volume loss. No midline shift. The basilar cisterns are patent. No cerebellar tonsillar ectopia. Moderate periventricular and scattered foci of deep white matter   hypodensities involving both cerebral hemispheres. No CT evidence for an acute infarct. Chronic lacunae or dilated perivascular space in the right basal ganglia.    VISUALIZED ORBITS/SINUSES/MASTOIDS: Prior bilateral cataract surgery. Visualized portions of the orbits are otherwise unremarkable. Mild mucosal thickening of the ethmoid air cells. Mild mucosal thickening of the right maxillary sinus. No middle ear or   mastoid effusion.    BONES/SOFT TISSUES: Skin staples over the right frontoparietal scalp with associated soft tissue thickening. No underlying skull fractures.      Impression    IMPRESSION:  1.  Skin staples over the right frontoparietal scalp with associated soft tissue thickening. No underlying skull fractures.  2.  No intracranial hemorrhage, mass lesions, hydrocephalus or CT evidence for an acute infarct.  3.  Mild diffuse cerebral parenchymal volume loss. Presumed chronic  hypertensive/microvascular ischemic white matter changes.     CT Cervical Spine w/o Contrast     Status: None    Narrative    EXAM: CT CERVICAL SPINE W/O CONTRAST  LOCATION: [facility]  DATE: 2/14/2024    INDICATION: Trauma, injury.  COMPARISON: Cervical spine MRI 08/25/2007  TECHNIQUE: Routine CT Cervical Spine without IV contrast. Multiplanar reformats. Dose reduction techniques were used.    FINDINGS: Please note this exam was completed and the entire images sent at 2:41 PM.    VERTEBRA: 1 mm retrolisthesis of C4 on C5. 2 mm retrolisthesis of C5 on C6. 2 mm spondylosis C7 on T1 and T1 on T2. The craniocervical junction is within normal limits. Mild superior endplate compression deformity of T2, age indeterminate although new   since prior cervical spine MRI. Partially visualized healing chronic posterior right fifth rib fracture.    CANAL/FORAMINA: Moderate intervertebral disc height loss from C4-C5 to C6-C7. Circumferential disc osteophyte complexes from C4-C5 to C6-C7 causing moderate spinal canal narrowing. Moderate right neuroforaminal narrowing at C2-C3. Moderate bilateral   neuroforaminal narrowing at C3-C4. Severe bilateral neuroforaminal narrowing at C5-C6. Severe right and moderate severe left neuroforaminal narrowing at C5-C6 and C6-C7.    PARASPINAL: Mild emphysematous changes of the lung apices.      Impression    IMPRESSION:  1.  Mild superior endplate compression deformity of T2, age indeterminate although new since prior cervical spine MRI 08/25/2007. Please correlate with point tenderness over this level.  2.  No cervical spine fractures or posttraumatic subluxations.  3.  Partially visualized healing chronic posterior right fifth rib fracture.  4.  Moderate spinal canal narrowing from C4-C5 to C6-C7.  5.  Severe bilateral neuroforaminal narrowing at C5-C6. Severe right and moderate severe left neuroforaminal narrowing at C5-C6 and C6-C7.     Basic metabolic panel      Status: Abnormal   Result Value Ref Range    Sodium 141 135 - 145 mmol/L    Potassium 2.8 (L) 3.4 - 5.3 mmol/L    Chloride 91 (L) 98 - 107 mmol/L    Carbon Dioxide (CO2) 39 (H) 22 - 29 mmol/L    Anion Gap 11 7 - 15 mmol/L    Urea Nitrogen 43.7 (H) 8.0 - 23.0 mg/dL    Creatinine 1.62 (H) 0.67 - 1.17 mg/dL    GFR Estimate 41 (L) >60 mL/min/1.73m2    Calcium 9.8 8.8 - 10.2 mg/dL    Glucose 179 (H) 70 - 99 mg/dL   [place] Draw     Status: None (In process)    Narrative    The following orders were created for panel order [place] Draw.  Procedure                               Abnormality         Status                     ---------                               -----------         ------                     Extra Blue Top Tube[[id number]]                              Final result               Extra Red Top Tube[[id number]]                               Final result               Extra Green Top (Lithium...[[id number]]                                                 Extra Purple Top Tube[[id number]]                                                       Extra Blood Bank Purple ...[[id number]]                                                   Please view results for these tests on the individual orders.   CBC with platelets and differential     Status: None   Result Value Ref Range    WBC Count 8.8 4.0 - 11.0 10e3/uL    RBC Count 4.81 4.40 - 5.90 10e6/uL    Hemoglobin 14.6 13.3 - 17.7 g/dL    Hematocrit 44.7 40.0 - 53.0 %    MCV 93 78 - 100 fL    MCH 30.4 26.5 - 33.0 pg    MCHC 32.7 31.5 - 36.5 g/dL    RDW 13.9 10.0 - 15.0 %    Platelet Count 255 150 - 450 10e3/uL    % Neutrophils 65 %    % Lymphocytes 24 %    % Monocytes 9 %    % Eosinophils 2 %    % Basophils 0 %    % Immature Granulocytes 0 %    NRBCs per 100 WBC 0 <1 /100    Absolute Neutrophils 5.7 1.6 - 8.3 10e3/uL    Absolute Lymphocytes 2.1 0.8 - 5.3 10e3/uL    Absolute Monocytes 0.8 0.0 - 1.3 10e3/uL    Absolute Eosinophils 0.2 0.0 - 0.7 10e3/uL    Absolute Basophils  0.0 0.0 - 0.2 10e3/uL    Absolute Immature Granulocytes 0.0 <=0.4 10e3/uL    Absolute NRBCs 0.0 10e3/uL   Extra Blue Top Tube     Status: None   Result Value Ref Range    Hold Specimen JIC    Extra Red Top Tube     Status: None   Result Value Ref Range    Hold Specimen JIC    CBC with platelets differential     Status: None    Narrative    The following orders were created for panel order CBC with platelets differential.  Procedure                               Abnormality         Status                     ---------                               -----------         ------                     CBC with platelets and d...[[id number]]                      Final result                 Please view results for these tests on the individual orders.       PROCEDURES:  Procedures:  PROCEDURE: Laceration Repair   INDICATIONS: Laceration   PROCEDURE PROVIDER: Dr Poppy Longoria   SITE: Right anterior forehead   TYPE/SIZE: simple, clean, and no foreign body visualized  4 cm (total length)   FUNCTIONAL ASSESSMENT: Distal sensation, circulation, and motor intact   MEDICATION: 4 mLs of 1% Lidocaine with epinephrine   PREPARATION: irrigation with Normal saline   DEBRIDEMENT: no debridement   CLOSURE:  Superficial layer closed with 3 stitches of 4-0 Vycril simple interrupted and 9 staples    Total number of sutures/staples placed: 12           [facility] Documentation     Medical Decision Making  Obtained supplemental history:Supplemental history obtained?: Family Member/Significant Other and EMS  Reviewed external records: External records reviewed?: Documented in chart  Care impacted by chronic illness:Chronic Kidney Disease, Chronic Lung Disease, Diabetes, Hyperlipidemia, Hypertension, Mental Health, and Smoking / Nicotine Use  Care significantly affected by social determinants of health:N/A  Did you consider but not order tests?: Work up considered but not performed and documented in chart, if applicable  Did you interpret  images independently?: Independent interpretation of ECG and images noted in documentation, when applicable.  Consultation discussion with other provider:Did you involve another provider (consultant, MH, pharmacy, etc.)?: No  Discharge. I prescribed additional prescription strength medication(s) as charted. See documentation for any additional details.         The creation of this record is based on the scribe s observations of the work being performed by Bettina Rivera and the provider s statements to them. This document has been checked and approved by MD Poppy Bobby MD  Emergency Medicine  [facility] EMERGENCY ROOM         Poppy Longoria MD  02/14/24 [id number]